# Patient Record
Sex: FEMALE | Race: WHITE
[De-identification: names, ages, dates, MRNs, and addresses within clinical notes are randomized per-mention and may not be internally consistent; named-entity substitution may affect disease eponyms.]

---

## 2017-12-23 ENCOUNTER — HOSPITAL ENCOUNTER (EMERGENCY)
Dept: HOSPITAL 8 - ED | Age: 52
Discharge: HOME | End: 2017-12-23
Payer: OTHER GOVERNMENT

## 2017-12-23 VITALS — DIASTOLIC BLOOD PRESSURE: 109 MMHG | SYSTOLIC BLOOD PRESSURE: 154 MMHG

## 2017-12-23 VITALS — HEIGHT: 63 IN | BODY MASS INDEX: 39.53 KG/M2 | WEIGHT: 223.11 LBS

## 2017-12-23 DIAGNOSIS — F17.200: ICD-10-CM
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
## 2017-12-23 DIAGNOSIS — S51.852A: Primary | ICD-10-CM
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
## 2017-12-23 PROCEDURE — 90471 IMMUNIZATION ADMIN: CPT

## 2017-12-23 PROCEDURE — 90715 TDAP VACCINE 7 YRS/> IM: CPT

## 2020-05-31 ENCOUNTER — HOSPITAL ENCOUNTER (EMERGENCY)
Dept: HOSPITAL 8 - ED | Age: 55
Discharge: HOME | End: 2020-05-31
Payer: OTHER GOVERNMENT

## 2020-05-31 VITALS — WEIGHT: 235.5 LBS | HEIGHT: 63 IN | BODY MASS INDEX: 41.73 KG/M2

## 2020-05-31 VITALS — DIASTOLIC BLOOD PRESSURE: 93 MMHG | SYSTOLIC BLOOD PRESSURE: 166 MMHG

## 2020-05-31 DIAGNOSIS — R94.31: ICD-10-CM

## 2020-05-31 DIAGNOSIS — R19.7: ICD-10-CM

## 2020-05-31 DIAGNOSIS — K80.20: Primary | ICD-10-CM

## 2020-05-31 DIAGNOSIS — Z90.89: ICD-10-CM

## 2020-05-31 LAB
ALBUMIN SERPL-MCNC: 4.4 G/DL (ref 3.4–5)
ALP SERPL-CCNC: 85 U/L (ref 45–117)
ALT SERPL-CCNC: 27 U/L (ref 12–78)
ANION GAP SERPL CALC-SCNC: 8 MMOL/L (ref 5–15)
BASOPHILS # BLD AUTO: 0 X10^3/UL (ref 0–0.1)
BASOPHILS NFR BLD AUTO: 0 % (ref 0–1)
BILIRUB SERPL-MCNC: 0.5 MG/DL (ref 0.2–1)
CALCIUM SERPL-MCNC: 9.4 MG/DL (ref 8.5–10.1)
CHLORIDE SERPL-SCNC: 108 MMOL/L (ref 98–107)
CREAT SERPL-MCNC: 0.8 MG/DL (ref 0.55–1.02)
EOSINOPHIL # BLD AUTO: 0.05 X10^3/UL (ref 0–0.4)
EOSINOPHIL NFR BLD AUTO: 1 % (ref 1–7)
ERYTHROCYTE [DISTWIDTH] IN BLOOD BY AUTOMATED COUNT: 13.3 % (ref 9.6–15.2)
LYMPHOCYTES # BLD AUTO: 0.78 X10^3/UL (ref 1–3.4)
LYMPHOCYTES NFR BLD AUTO: 8 % (ref 22–44)
MCH RBC QN AUTO: 29.4 PG (ref 27–34.8)
MCHC RBC AUTO-ENTMCNC: 33.9 G/DL (ref 32.4–35.8)
MCV RBC AUTO: 86.9 FL (ref 80–100)
MD: NO
MICROSCOPIC: (no result)
MONOCYTES # BLD AUTO: 0.1 X10^3/UL (ref 0.2–0.8)
MONOCYTES NFR BLD AUTO: 1 % (ref 2–9)
NEUTROPHILS # BLD AUTO: 8.34 X10^3/UL (ref 1.8–6.8)
NEUTROPHILS NFR BLD AUTO: 90 % (ref 42–75)
PLATELET # BLD AUTO: 172 X10^3/UL (ref 130–400)
PMV BLD AUTO: 8.7 FL (ref 7.4–10.4)
PROT SERPL-MCNC: 8.2 G/DL (ref 6.4–8.2)
RBC # BLD AUTO: 4.94 X10^6/UL (ref 3.82–5.3)

## 2020-05-31 PROCEDURE — 76700 US EXAM ABDOM COMPLETE: CPT

## 2020-05-31 PROCEDURE — 96361 HYDRATE IV INFUSION ADD-ON: CPT

## 2020-05-31 PROCEDURE — 80053 COMPREHEN METABOLIC PANEL: CPT

## 2020-05-31 PROCEDURE — 96374 THER/PROPH/DIAG INJ IV PUSH: CPT

## 2020-05-31 PROCEDURE — 93005 ELECTROCARDIOGRAM TRACING: CPT

## 2020-05-31 PROCEDURE — 85025 COMPLETE CBC W/AUTO DIFF WBC: CPT

## 2020-05-31 PROCEDURE — 96375 TX/PRO/DX INJ NEW DRUG ADDON: CPT

## 2020-05-31 PROCEDURE — 83690 ASSAY OF LIPASE: CPT

## 2020-05-31 PROCEDURE — 99285 EMERGENCY DEPT VISIT HI MDM: CPT

## 2020-05-31 PROCEDURE — 81003 URINALYSIS AUTO W/O SCOPE: CPT

## 2020-05-31 PROCEDURE — 36415 COLL VENOUS BLD VENIPUNCTURE: CPT

## 2020-05-31 NOTE — NUR
D/C INSTRUCTIONS, MEDS & F/U APPT RV'WD WITH PT, SHE VERBALIZES UNDERSTANDING. 
RX GIVEN X2. ASSISTED OUT OF ED VIA WC WITH .

## 2020-05-31 NOTE — NUR
ASSISTED PT TO BR VIA WC. VSS. ABD PAIN DOWN TO 7/10 AFTER MORPHINE. NO ACTIVE 
VOMITING.  REMAINS AT BS.

## 2020-05-31 NOTE — NUR
PT MEDICATED PER ORDERS (REQUESTED PARTIAL DOSE 2MG OF MORPHINE). IV BOLUS 
INFUSING.  REMAINS AT BS. PT UNDERSTANDS POC. 

-------------------------------------------------------------------------------

Addendum: 05/31/20 at 1402 by TYON

-------------------------------------------------------------------------------

PT DECIDED TO TAKE FULL 4MG DOSE OF MORPHINE FOR UNRELIEVED ABD PAIN.

## 2020-05-31 NOTE — NUR
ERP WAS IN TO SEE PT. PT IN MODERATE DISTRESS D/T EPIGASTRIC ABD PAIN. LABS 
DRAWN, IV STARTED, POC RV'WD WITH PT.  AT BS.